# Patient Record
Sex: FEMALE | Race: WHITE | NOT HISPANIC OR LATINO
[De-identification: names, ages, dates, MRNs, and addresses within clinical notes are randomized per-mention and may not be internally consistent; named-entity substitution may affect disease eponyms.]

---

## 2021-02-23 ENCOUNTER — RX ONLY (OUTPATIENT)
Age: 71
Setting detail: RX ONLY
End: 2021-02-23

## 2021-02-23 ENCOUNTER — APPOINTMENT (RX ONLY)
Dept: URBAN - METROPOLITAN AREA CLINIC 126 | Facility: CLINIC | Age: 71
Setting detail: DERMATOLOGY
End: 2021-02-23

## 2021-02-23 DIAGNOSIS — L0391 CELLULITIS AND ABSCESS OF UNSPECIFIED SITES: ICD-10-CM

## 2021-02-23 DIAGNOSIS — L81.4 OTHER MELANIN HYPERPIGMENTATION: ICD-10-CM

## 2021-02-23 DIAGNOSIS — L0390 CELLULITIS AND ABSCESS OF UNSPECIFIED SITES: ICD-10-CM

## 2021-02-23 DIAGNOSIS — L82.1 OTHER SEBORRHEIC KERATOSIS: ICD-10-CM

## 2021-02-23 DIAGNOSIS — Z71.89 OTHER SPECIFIED COUNSELING: ICD-10-CM

## 2021-02-23 PROBLEM — L02.212 CUTANEOUS ABSCESS OF BACK [ANY PART, EXCEPT BUTTOCK]: Status: ACTIVE | Noted: 2021-02-23

## 2021-02-23 PROCEDURE — ? PRESCRIPTION

## 2021-02-23 PROCEDURE — ? PHOTO-DOCUMENTATION

## 2021-02-23 PROCEDURE — ? DIAGNOSIS COMMENT

## 2021-02-23 PROCEDURE — 10060 I&D ABSCESS SIMPLE/SINGLE: CPT

## 2021-02-23 PROCEDURE — ? SUNSCREEN RECOMMENDATIONS

## 2021-02-23 PROCEDURE — ? COUNSELING

## 2021-02-23 PROCEDURE — 99203 OFFICE O/P NEW LOW 30 MIN: CPT | Mod: 25

## 2021-02-23 PROCEDURE — ? INCISION AND DRAINAGE

## 2021-02-23 RX ORDER — FLUCONAZOLE 200 MG/1
TABLET ORAL
Qty: 2 | Refills: 0 | Status: ERX | COMMUNITY
Start: 2021-02-23

## 2021-02-23 RX ORDER — CEPHALEXIN 500 MG/1
CAPSULE ORAL
Qty: 21 | Refills: 0 | Status: ERX | COMMUNITY
Start: 2021-02-23

## 2021-02-23 RX ORDER — CEPHALEXIN 500 MG/1
TABLET ORAL BID
Qty: 21 | Refills: 0 | Status: ERX | COMMUNITY
Start: 2021-02-23

## 2021-02-23 RX ADMIN — CEPHALEXIN: 500 TABLET ORAL at 00:00

## 2021-02-23 ASSESSMENT — LOCATION DETAILED DESCRIPTION DERM
LOCATION DETAILED: MIDDLE STERNUM
LOCATION DETAILED: RIGHT PROXIMAL DORSAL FOREARM
LOCATION DETAILED: LEFT SUPERIOR MEDIAL UPPER BACK
LOCATION DETAILED: SUPERIOR THORACIC SPINE
LOCATION DETAILED: LEFT PROXIMAL DORSAL FOREARM
LOCATION DETAILED: LEFT DISTAL RADIAL DORSAL FOREARM
LOCATION DETAILED: RIGHT DISTAL DORSAL FOREARM
LOCATION DETAILED: RIGHT MID-UPPER BACK

## 2021-02-23 ASSESSMENT — LOCATION SIMPLE DESCRIPTION DERM
LOCATION SIMPLE: RIGHT UPPER BACK
LOCATION SIMPLE: LEFT FOREARM
LOCATION SIMPLE: CHEST
LOCATION SIMPLE: UPPER BACK
LOCATION SIMPLE: RIGHT FOREARM
LOCATION SIMPLE: LEFT UPPER BACK

## 2021-02-23 ASSESSMENT — LOCATION ZONE DERM
LOCATION ZONE: ARM
LOCATION ZONE: TRUNK

## 2021-02-23 NOTE — PROCEDURE: INCISION AND DRAINAGE
Body Location Override (Optional - Billing Will Still Be Based On Selected Body Map Location If Applicable): right mid back
Detail Level: Simple
Lesion Type: Cyst
Method: 11 blade
Curette: No
Anesthesia Type: 1% lidocaine with 1:100,000 epinephrine and a 1:10 solution of 8.4% sodium bicarbonate
Anesthesia Volume In Cc: 3
Size Of Lesion In Cm (Optional But May Be Required For Some Insurances): 0
Dressing: pressure dressing
Epidermal Sutures: 4-0 Ethilon
Epidermal Closure: simple interrupted
Suture Text: The incision was partially closed with
Preparation Text: The area was prepped in the usual clean fashion.
Curette Text (Optional): After the contents were expressed a curette was used to partially remove the cyst wall.
Consent was obtained and risks were reviewed including but not limited to delayed wound healing, infection, need for multiple I and D's, and pain.
Post-Care Instructions: I reviewed with the patient in detail post-care instructions. Patient should keep wound covered and call the office should any redness, pain, swelling or worsening occur.

## 2021-02-23 NOTE — PROCEDURE: MIPS QUALITY
Quality 111:Pneumonia Vaccination Status For Older Adults: Pneumococcal Vaccination Previously Received
Quality 130: Documentation Of Current Medications In The Medical Record: Current Medications Documented
Quality 47: Advance Care Plan: Advance Care Planning discussed and documented in the medical record; patient did not wish or was not able to name a surrogate decision maker or provide an advance care plan.
Quality 226: Preventive Care And Screening: Tobacco Use: Screening And Cessation Intervention: Patient screened for tobacco use and is an ex/non-smoker
Detail Level: Detailed

## 2021-02-23 NOTE — PROCEDURE: DIAGNOSIS COMMENT
Render Risk Assessment In Note?: no
Comment: Ruptured EIC. Schedule E&S for 3-[address] Karma Jolley or Dr. Victor Hugo Houser. Inflamed and irritated.
Detail Level: Zone

## 2021-02-23 NOTE — PROCEDURE: COUNSELING
Detail Level: Detailed
Detail Level: Zone
Sunscreen Recommendations: Sunscreen with a minimum of 30 SPF, zinc or titanium such as [employer] or [employer].
Detail Level: Generalized

## 2021-11-08 ENCOUNTER — APPOINTMENT (RX ONLY)
Dept: URBAN - METROPOLITAN AREA CLINIC 129 | Facility: CLINIC | Age: 71
Setting detail: DERMATOLOGY
End: 2021-11-08

## 2021-11-08 DIAGNOSIS — L57.8 OTHER SKIN CHANGES DUE TO CHRONIC EXPOSURE TO NONIONIZING RADIATION: ICD-10-CM

## 2021-11-08 DIAGNOSIS — L57.3 POIKILODERMA OF CIVATTE: ICD-10-CM

## 2021-11-08 DIAGNOSIS — D49.2 NEOPLASM OF UNSPECIFIED BEHAVIOR OF BONE, SOFT TISSUE, AND SKIN: ICD-10-CM

## 2021-11-08 PROCEDURE — 11102 TANGNTL BX SKIN SINGLE LES: CPT

## 2021-11-08 PROCEDURE — ? COUNSELING

## 2021-11-08 PROCEDURE — ? PRESCRIPTION MEDICATION MANAGEMENT

## 2021-11-08 PROCEDURE — ? BIOPSY BY SHAVE METHOD

## 2021-11-08 PROCEDURE — 99214 OFFICE O/P EST MOD 30 MIN: CPT | Mod: 25

## 2021-11-08 ASSESSMENT — LOCATION ZONE DERM: LOCATION ZONE: TRUNK

## 2021-11-08 ASSESSMENT — LOCATION SIMPLE DESCRIPTION DERM: LOCATION SIMPLE: CHEST

## 2021-11-08 ASSESSMENT — LOCATION DETAILED DESCRIPTION DERM
LOCATION DETAILED: RIGHT MEDIAL SUPERIOR CHEST
LOCATION DETAILED: UPPER STERNUM

## 2021-11-08 NOTE — PROCEDURE: BIOPSY BY SHAVE METHOD
Detail Level: Detailed
Depth Of Biopsy: dermis
Was A Bandage Applied: Yes
Size Of Lesion In Cm: 0.5
Biopsy Type: H and E
Biopsy Method: Dermablade
Anesthesia Type: 1% lidocaine with epinephrine and a 1:10 solution of 8.4% sodium bicarbonate
Additional Anesthesia Volume In Cc (Will Not Render If 0): 0
Hemostasis: Aluminum Chloride
Wound Care: Mupirocin
Dressing: bandage
Destruction After The Procedure: No
Type Of Destruction Used: Curettage
Curettage Text: The wound bed was treated with curettage after the biopsy was performed.
Cryotherapy Text: The wound bed was treated with cryotherapy after the biopsy was performed.
Electrodesiccation Text: The wound bed was treated with electrodesiccation after the biopsy was performed.
Electrodesiccation And Curettage Text: The wound bed was treated with electrodesiccation and curettage after the biopsy was performed.
Silver Nitrate Text: The wound bed was treated with silver nitrate after the biopsy was performed.
Lab: [facility]
Lab Facility: [address]
Consent: Written consent was obtained and risks were reviewed including but not limited to scarring, infection, bleeding, scabbing, incomplete removal, nerve damage and allergy to anesthesia.
Post-Care Instructions: I reviewed with the patient in detail post-care instructions. Patient is to keep the biopsy site dry overnight, and then apply bacitracin twice daily until healed. Patient may apply hydrogen peroxide soaks to remove any crusting.
Notification Instructions: Patient will be notified of biopsy results. However, patient instructed to call the office if not contacted within 2 weeks.
Billing Type: [employer]
Information: Selecting Yes will display possible errors in your note based on the variables you have selected. This validation is only offered as a suggestion for you. PLEASE NOTE THAT THE VALIDATION TEXT WILL BE REMOVED WHEN YOU FINALIZE YOUR NOTE. IF YOU WANT TO FAX A PRELIMINARY NOTE YOU WILL NEED TO TOGGLE THIS TO 'NO' IF YOU DO NOT WANT IT IN YOUR FAXED NOTE.